# Patient Record
Sex: MALE | Race: WHITE | NOT HISPANIC OR LATINO | RURAL
[De-identification: names, ages, dates, MRNs, and addresses within clinical notes are randomized per-mention and may not be internally consistent; named-entity substitution may affect disease eponyms.]

---

## 2023-12-20 ENCOUNTER — CLAIMS CREATED FROM THE CLAIM WINDOW (OUTPATIENT)
Dept: RURAL CLINIC 2 | Facility: CLINIC | Age: 68
End: 2023-12-20
Payer: MEDICARE

## 2023-12-20 ENCOUNTER — OFFICE VISIT (OUTPATIENT)
Dept: RURAL CLINIC 2 | Facility: CLINIC | Age: 68
End: 2023-12-20
Payer: MEDICARE

## 2023-12-20 VITALS
BODY MASS INDEX: 26.86 KG/M2 | DIASTOLIC BLOOD PRESSURE: 70 MMHG | TEMPERATURE: 96.9 F | HEIGHT: 75 IN | HEART RATE: 75 BPM | WEIGHT: 216 LBS | SYSTOLIC BLOOD PRESSURE: 145 MMHG

## 2023-12-20 DIAGNOSIS — I10 HYPERTENSION, UNSPECIFIED TYPE: ICD-10-CM

## 2023-12-20 DIAGNOSIS — R74.01 TRANSAMINITIS: ICD-10-CM

## 2023-12-20 DIAGNOSIS — I10 ACCELERATED ESSENTIAL HYPERTENSION: ICD-10-CM

## 2023-12-20 PROCEDURE — 99203 OFFICE O/P NEW LOW 30 MIN: CPT | Performed by: NURSE PRACTITIONER

## 2023-12-20 RX ORDER — VALSARTAN 160 MG/1
1 TABLET TABLET, FILM COATED ORAL ONCE A DAY
Status: ACTIVE | COMMUNITY

## 2023-12-20 RX ORDER — ATORVASTATIN CALCIUM 10 MG/1
1 TABLET TABLET, FILM COATED ORAL ONCE A DAY
Status: ACTIVE | COMMUNITY

## 2023-12-20 RX ORDER — AMLODIPINE BESYLATE 5 MG/1
1 TABLET TABLET ORAL ONCE A DAY
Status: ACTIVE | COMMUNITY

## 2023-12-20 NOTE — HPI-ZZZTODAY'S VISIT
The patient is a 68-year-old gentleman who presents on referral from Dr. Alexsandra Shaw for persistently elevated transaminases on routine blood work.  A copy of this document to be sent to the referring provider.  The patient reports mildly elevated liver enzymes on his last few last few blood draws revealing an AST level of 41 and an ALT level of 74.  Social history is negative for alcohol use.  He recently completed lab work to include FibroSure, hepatitis panel as well as a right upper quadrant abdominal ultrasound and the results are currently pending. The patient has not had a screening colonoscopy in the past and completes Cologuard DNA stool testing every 3 years and thus far has been negative.

## 2023-12-20 NOTE — PHYSICAL EXAM CHEST:
chest wall non-tender, breathing is unlabored without accessory muscle use, normal breath sounds
30-May-2017

## 2023-12-21 PROBLEM — 38341003: Status: ACTIVE | Noted: 2023-12-21

## 2023-12-21 PROBLEM — 365767001: Status: ACTIVE | Noted: 2023-12-21

## 2024-01-04 ENCOUNTER — TELEPHONE ENCOUNTER (OUTPATIENT)
Dept: RURAL CLINIC 8 | Facility: CLINIC | Age: 69
End: 2024-01-04

## 2024-01-04 ENCOUNTER — LAB OUTSIDE AN ENCOUNTER (OUTPATIENT)
Dept: RURAL CLINIC 8 | Facility: CLINIC | Age: 69
End: 2024-01-04

## 2024-04-29 ENCOUNTER — OV EP (OUTPATIENT)
Dept: RURAL CLINIC 2 | Facility: CLINIC | Age: 69
End: 2024-04-29

## 2024-04-29 RX ORDER — ATORVASTATIN CALCIUM 10 MG/1
1 TABLET TABLET, FILM COATED ORAL ONCE A DAY
COMMUNITY

## 2024-04-29 RX ORDER — VALSARTAN 160 MG/1
1 TABLET TABLET, FILM COATED ORAL ONCE A DAY
COMMUNITY

## 2024-04-29 RX ORDER — AMLODIPINE BESYLATE 5 MG/1
1 TABLET TABLET ORAL ONCE A DAY
COMMUNITY